# Patient Record
Sex: FEMALE | Race: WHITE
[De-identification: names, ages, dates, MRNs, and addresses within clinical notes are randomized per-mention and may not be internally consistent; named-entity substitution may affect disease eponyms.]

---

## 2017-01-05 ENCOUNTER — HOSPITAL ENCOUNTER (OUTPATIENT)
Dept: HOSPITAL 47 - RADMAMWWP | Age: 72
Discharge: HOME | End: 2017-01-05
Payer: MEDICARE

## 2017-01-05 DIAGNOSIS — Z12.31: Primary | ICD-10-CM

## 2017-01-05 PROCEDURE — 77052: CPT

## 2017-01-05 PROCEDURE — 77063 BREAST TOMOSYNTHESIS BI: CPT

## 2017-01-06 NOTE — MM
Reason for exam: screening  (asymptomatic).

Last mammogram was performed 1 year and 2 months ago.



History:

Patient is postmenopausal.

Taking other hormone for 10 years.



Physical Findings:

A clinical breast exam by your physician is recommended on an annual basis and 

results should be correlated with mammographic findings.



MG 3D Screening Mammo W/Cad

Bilateral CC and MLO view(s) were taken.

Prior study comparison: October 21, 2015, bilateral MG 3d screening mammo w/cad.  

October 24, 2013, bilateral digital screening mammo w/CAD.

There are scattered fibroglandular densities.  There is chronic nodularity 

bilaterally, greater in the left breast. There is no dominant lesion.





ASSESSMENT: Benign, BI-RAD 2



RECOMMENDATION:

Routine screening mammogram of both breasts in 1 year.

## 2018-04-13 ENCOUNTER — HOSPITAL ENCOUNTER (OUTPATIENT)
Dept: HOSPITAL 47 - CATHEP | Age: 73
Discharge: HOME | End: 2018-04-13
Payer: MEDICARE

## 2018-04-13 VITALS — TEMPERATURE: 97.6 F

## 2018-04-13 VITALS — DIASTOLIC BLOOD PRESSURE: 54 MMHG | HEART RATE: 54 BPM | SYSTOLIC BLOOD PRESSURE: 108 MMHG

## 2018-04-13 VITALS — BODY MASS INDEX: 43.2 KG/M2

## 2018-04-13 VITALS — RESPIRATION RATE: 18 BRPM

## 2018-04-13 DIAGNOSIS — E78.5: ICD-10-CM

## 2018-04-13 DIAGNOSIS — Z88.5: ICD-10-CM

## 2018-04-13 DIAGNOSIS — Z91.040: ICD-10-CM

## 2018-04-13 DIAGNOSIS — I10: ICD-10-CM

## 2018-04-13 DIAGNOSIS — I48.1: Primary | ICD-10-CM

## 2018-04-13 DIAGNOSIS — I25.10: ICD-10-CM

## 2018-04-13 DIAGNOSIS — Z79.899: ICD-10-CM

## 2018-04-13 DIAGNOSIS — Z79.01: ICD-10-CM

## 2018-04-13 DIAGNOSIS — E03.9: ICD-10-CM

## 2018-04-13 DIAGNOSIS — I42.9: ICD-10-CM

## 2018-04-13 DIAGNOSIS — Z88.8: ICD-10-CM

## 2018-04-13 PROCEDURE — 93005 ELECTROCARDIOGRAM TRACING: CPT

## 2018-04-13 PROCEDURE — 92960 CARDIOVERSION ELECTRIC EXT: CPT

## 2018-04-13 NOTE — P.PCN
Preoperative Diagnosis: 


Procedure


Electrical cardioversion for atrial fibrillation





Indication for the procedure


Symptomatic atrial fibrillation with RVR





Procedure details


Anesthesia in attendance


Successful electrical cardioversion with a single 360 J shock, biphasic, AP 

configuration to sinus rhythm





Plan


Continue anticoagulation and continue flecainide and metoprolol


Follow Dr. Abreu in 4 weeks


Anesthesia: MAC


Condition: stable

## 2018-05-02 ENCOUNTER — HOSPITAL ENCOUNTER (OUTPATIENT)
Dept: HOSPITAL 47 - RADBDWWP | Age: 73
Discharge: HOME | End: 2018-05-02
Payer: MEDICARE

## 2018-05-02 DIAGNOSIS — N95.8: Primary | ICD-10-CM

## 2018-05-02 PROCEDURE — 77080 DXA BONE DENSITY AXIAL: CPT

## 2018-05-02 NOTE — BD
EXAMINATION TYPE: MG DEXA axial skeleton.  

 

DATE OF EXAM: 2018

 

CLINICAL HISTORY: 72-year-old female with menopausal and perimenopausal disorders

 

Height:  64.5

Weight:  270

 

FRAX RISK QUESTIONS:

Alcohol (3 or more units per day):  no

Family History (Parent hip fracture):  no

Glucocorticoids (More than 3mos):  no

           (Ex: prednisone, prednisolone, methylprednisolone, dexamethasone, and hydrocortisone).    
     

History of Fracture in Adulthood: yes, ankle...long time ago

Secondary Osteoporosis: 

  1.  Type 1 Diabetes: no

  2.  Hyperthyroidism: unsure

  3.  Menopause before 45: no

  4.  Malnutrition: no

  5.  Chronic liver disease: no

Rheumatoid Arthritis: no

Current Tobacco Use: no

 

RISK FACTORS 

HISTORY OF: 

Family History of Osteoporosis: cousin

Active: yes

Diet low in dairy products/other sources of calcium:  several servings a week

Postmenopausal woman: yes

Take estrogen and/or progesterone medications: no

Lost more than 2 inches in height since high school: no

Frequent falls: no

Poor Health: no

Hyperparathyroidism: no

Adrenal Insufficiency: no

 

MEDICATIONS: 

Prednisone or other steroids: no

Thyroid Medications:yes  

Which medication unsure

How Lon-15 years

Osteoporosis Medications: no

Additional Medications: calcium with Vitamin D; blood pressure meds 

 

 

Additional History: had a "bout" with "A-Fib" a few years back

 

 

EXAM MEASUREMENTS: 

Bone mineral densitometry was performed using the [employer] [facility].

Bone mineral density as measured about the Lumbar spine is:  

----- L1-L4(G/cm2): 1.469

T Score Values are as follows:

----- L2: 0.7

----- L3: 1.6

----- L4: 5.5

----- L1-L4: 2.4

Bone mineral density has: Increased 27.3% since study of: 2006

 

Bone mineral density about the R hip (g/cm2): 0.953

Bone mineral density about the L hip (g/cm2): 1.019

T Score values are as follows:

-----R Neck: -0.6

-----L Neck: -0.1

-----R Total: 0.8

-----L Total: 0.6

Bone mineral density has: Increased 11.0% since study of: 2006

 

 

IMPRESSION:

Normal (Values between +1 and -1 indicate normal bone mass).  Consider repeating this study in 5 year
s or sooner if there is some new clinical indication.

 

 

 

 

 

NOTE:  T-SCORE=SD OF THE YOUNG ADULT MEAN.

## 2019-10-15 ENCOUNTER — HOSPITAL ENCOUNTER (OUTPATIENT)
Dept: HOSPITAL 47 - CATHCVL | Age: 74
End: 2019-10-15
Attending: INTERNAL MEDICINE
Payer: MEDICARE

## 2019-10-15 VITALS — TEMPERATURE: 98 F | RESPIRATION RATE: 18 BRPM

## 2019-10-15 VITALS — BODY MASS INDEX: 44.1 KG/M2

## 2019-10-15 VITALS — HEART RATE: 86 BPM | DIASTOLIC BLOOD PRESSURE: 62 MMHG | SYSTOLIC BLOOD PRESSURE: 116 MMHG

## 2019-10-15 DIAGNOSIS — E78.00: ICD-10-CM

## 2019-10-15 DIAGNOSIS — E03.9: ICD-10-CM

## 2019-10-15 DIAGNOSIS — Z79.899: ICD-10-CM

## 2019-10-15 DIAGNOSIS — Z88.6: ICD-10-CM

## 2019-10-15 DIAGNOSIS — Z79.01: ICD-10-CM

## 2019-10-15 DIAGNOSIS — Z91.040: ICD-10-CM

## 2019-10-15 DIAGNOSIS — I42.9: ICD-10-CM

## 2019-10-15 DIAGNOSIS — Z79.890: ICD-10-CM

## 2019-10-15 DIAGNOSIS — I10: ICD-10-CM

## 2019-10-15 DIAGNOSIS — Z88.5: ICD-10-CM

## 2019-10-15 DIAGNOSIS — E66.9: ICD-10-CM

## 2019-10-15 DIAGNOSIS — E78.5: ICD-10-CM

## 2019-10-15 DIAGNOSIS — I25.110: Primary | ICD-10-CM

## 2019-10-15 DIAGNOSIS — I48.91: ICD-10-CM

## 2019-10-15 PROCEDURE — 93458 L HRT ARTERY/VENTRICLE ANGIO: CPT

## 2019-10-15 NOTE — LTR
DATE OF SERVICE:  October 15, 2019







RE:  Yuli Prince





Dear Dr. Tobin:





Ms. Yuli Vail underwent a heart catheterization today and that revealed

intermediate disease involving the mid left circumflex coronary artery and I advised

maximized medical treatment only.





I want to thank you for allowing us to participate in her care and please do not

hesitate to call if you have any question or concerns.





Sincerely,





Uriel Harrell MD





MMVANESSA / MAGDALENEN: [id number] / Job#: [id number]

## 2019-10-15 NOTE — CC
CARDIAC CATHETERIZATION REPORT



DATE OF SERVICE:

10/15/2019



PERFORMING PHYSICIAN:

Uriel Harrell MD, Interventional Cardiologist.



PROCEDURE PERFORMED:

1. Selective right and left coronary angiogram.

2. Left heart catheterization.



INDICATION:

This is a 74-year-old female patient with history of coronary artery disease, chronic

persistent atrial fibrillation on long-term anticoagulation, hypertension, and

dyslipidemia, was experiencing increasing in the shortness of breath.  She underwent an

echocardiogram which revealed cardiomyopathy.  The stress test revealed ischemia in the

anteroseptal segments of the LV.  Because of that, a heart catheterization was advised.



APPROACH:

Right radial artery.



COMPLICATION:

None.



LEVEL OF SEDATION:

Moderate with sedation length of 15 minutes.



PROCEDURE DESCRIPTION:

After obtaining an informed consent, the patient was brought to the cardiac cath lab.

The right radial artery was cannulated using micropuncture technique, the micropuncture

wire passed easily, then I placed a 6-Italian sheath.  I gave the patient after that 2

mg of verapamil IA and 10,000 units of heparin IV.  I did perform selective right and

left coronary angiogram using JR4 and JL3.5 catheters.  Left heart catheterization was

performed using the JR4 catheter which crossed the aortic valve.  I did after that,

pullback after the catheter was flushed.  The procedure was completed without any

complication.



SELECTIVE CORONARY ANGIOGRAM:

1. The right coronary artery is a large caliber vessel and it is a dominant vessel.

    The RCA has mild disease in the proximal portion, but in the mid and distal portion

    it is angiographically normal and bifurcates into PDA and PLV branches, both

    appeared to be angiographically normal.

2. The left main is long, but angiographically normal.  It bifurcates into left

    circumflex and left anterior descending artery.

3. The left circumflex is a large caliber vessel, it is a nondominant vessel.  The mid

    left circumflex just proximal to the bifurcation of a large OM branch appeared to

    have a lesion in the range of 50%.  First OM branch appeared to be angiographically

    normal.  The circumflex continued after that as a medium caliber vessel in the AV

    groove.

4. The LAD, the proximal LAD appears to be angiographically normal.  The mid LAD

    appeared to be normal as well and the LAD distally is angiographically normal.  The

    LAD gives rise into a large diagonal branch which has mild disease in its ostium.



HEMODYNAMICS:

The LVEDP was 10-12 mmHg without significant gradient across the aortic valve.



CONCLUSION:

1. Intermediate disease involving the mid left circumflex, appeared to be in the range

    of 50%.

2. Normal left ventricular end-diastolic pressure.



POSTPROCEDURE MANAGEMENT:

1. Aggressive cholesterol control.

2. Risk factors modifications.

3. Medical treatment.

4. Follow up with the patient.





MMODL / IJN: [id number] / Job#: [id number]

## 2019-12-23 ENCOUNTER — HOSPITAL ENCOUNTER (OUTPATIENT)
Dept: HOSPITAL 47 - RADMAMWWP | Age: 74
Discharge: HOME | End: 2019-12-23
Attending: INTERNAL MEDICINE
Payer: MEDICARE

## 2019-12-23 DIAGNOSIS — Z12.31: Primary | ICD-10-CM

## 2019-12-23 PROCEDURE — 77063 BREAST TOMOSYNTHESIS BI: CPT

## 2019-12-23 PROCEDURE — 77067 SCR MAMMO BI INCL CAD: CPT

## 2019-12-27 NOTE — MM
Reason for exam: screening  (asymptomatic).

Last mammogram was performed 3 years ago.



History:

Patient is postmenopausal.

Taking other hormone for 10 years.



Physical Findings:

A clinical breast exam by your physician is recommended on an annual basis and 

results should be correlated with mammographic findings.



MG 3D Screening Mammo W/Cad

Bilateral CC and MLO view(s) were taken.

Prior study comparison: January 5, 2017, bilateral MG 3d screening mammo w/cad.  

October 21, 2015, bilateral MG 3d screening mammo w/cad.

The breast tissue is heterogeneously dense. This may lower the sensitivity of 

mammography.  There is chronic nodularity in the left breast. There is no discrete

abnormality.





ASSESSMENT: Benign, BI-RAD 2



RECOMMENDATION:

Routine screening mammogram of both breasts in 1 year.

## 2025-01-27 ENCOUNTER — HOSPITAL ENCOUNTER (OUTPATIENT)
Dept: HOSPITAL 47 - LABWHC1 | Age: 80
Discharge: HOME | End: 2025-01-27
Attending: INTERNAL MEDICINE
Payer: MEDICARE

## 2025-01-27 DIAGNOSIS — I48.92: ICD-10-CM

## 2025-01-27 DIAGNOSIS — I48.19: ICD-10-CM

## 2025-01-27 DIAGNOSIS — Z01.812: Primary | ICD-10-CM

## 2025-01-27 LAB
ANION GAP SERPL CALC-SCNC: 16.3 MMOL/L (ref 4–12)
BUN SERPL-SCNC: 18.1 MG/DL (ref 9–27)
CHLORIDE SERPL-SCNC: 111 MMOL/L (ref 96–109)
CO2 SERPL-SCNC: 19.7 MMOL/L (ref 21.6–31.8)
ERYTHROCYTE [DISTWIDTH] IN BLOOD BY AUTOMATED COUNT: 4.92 X 10*6/UL (ref 4.1–5.2)
ERYTHROCYTE [DISTWIDTH] IN BLOOD: 14.5 % (ref 11.5–14.5)
HCT VFR BLD AUTO: 44.2 % (ref 37.2–46.3)
HGB BLD-MCNC: 13.6 G/DL (ref 12–15)
MCH RBC QN AUTO: 27.6 PG (ref 27–32)
MCHC RBC AUTO-ENTMCNC: 30.8 G/DL (ref 32–37)
MCV RBC AUTO: 89.8 FL (ref 80–97)
NRBC BLD AUTO-RTO: 0 X 10*3/UL (ref 0–0.01)
PLATELET # BLD AUTO: 193 X 10*3/UL (ref 140–440)
POTASSIUM SERPL-SCNC: 4.3 MMOL/L (ref 3.5–5.5)
SODIUM SERPL-SCNC: 147 MMOL/L (ref 135–145)
WBC # BLD AUTO: 7.12 X 10*3/UL (ref 4.5–10)

## 2025-01-27 PROCEDURE — 36415 COLL VENOUS BLD VENIPUNCTURE: CPT

## 2025-01-27 PROCEDURE — 80051 ELECTROLYTE PANEL: CPT

## 2025-01-27 PROCEDURE — 84520 ASSAY OF UREA NITROGEN: CPT

## 2025-01-27 PROCEDURE — 82565 ASSAY OF CREATININE: CPT

## 2025-01-27 PROCEDURE — 85027 COMPLETE CBC AUTOMATED: CPT
